# Patient Record
Sex: FEMALE | Race: OTHER
[De-identification: names, ages, dates, MRNs, and addresses within clinical notes are randomized per-mention and may not be internally consistent; named-entity substitution may affect disease eponyms.]

---

## 2019-06-26 ENCOUNTER — HOSPITAL ENCOUNTER (OUTPATIENT)
Dept: HOSPITAL 74 - JRADIR | Age: 44
Discharge: HOME | End: 2019-06-26
Payer: COMMERCIAL

## 2019-06-26 VITALS — SYSTOLIC BLOOD PRESSURE: 139 MMHG | DIASTOLIC BLOOD PRESSURE: 60 MMHG | TEMPERATURE: 97.3 F | HEART RATE: 52 BPM

## 2019-06-26 VITALS — BODY MASS INDEX: 25.7 KG/M2

## 2019-06-26 DIAGNOSIS — C50.912: Primary | ICD-10-CM

## 2019-06-26 PROCEDURE — B518ZZA FLUOROSCOPY OF SUPERIOR VENA CAVA, GUIDANCE: ICD-10-PCS

## 2019-06-26 PROCEDURE — 36561 INSERT TUNNELED CV CATH: CPT

## 2019-06-26 PROCEDURE — C1788 PORT, INDWELLING, IMP: HCPCS

## 2019-06-26 PROCEDURE — 02HV33Z INSERTION OF INFUSION DEVICE INTO SUPERIOR VENA CAVA, PERCUTANEOUS APPROACH: ICD-10-PCS

## 2019-06-26 PROCEDURE — 77001 FLUOROGUIDE FOR VEIN DEVICE: CPT

## 2019-10-31 ENCOUNTER — HOSPITAL ENCOUNTER (INPATIENT)
Dept: HOSPITAL 74 - FM/S | Age: 44
LOS: 1 days | Discharge: HOME | DRG: 362 | End: 2019-11-01
Attending: SURGERY | Admitting: SURGERY
Payer: COMMERCIAL

## 2019-10-31 VITALS — BODY MASS INDEX: 25.8 KG/M2

## 2019-10-31 DIAGNOSIS — C77.3: ICD-10-CM

## 2019-10-31 DIAGNOSIS — C50.812: Primary | ICD-10-CM

## 2019-10-31 DIAGNOSIS — C79.51: ICD-10-CM

## 2019-10-31 DIAGNOSIS — Z17.1: ICD-10-CM

## 2019-10-31 DIAGNOSIS — M95.4: ICD-10-CM

## 2019-10-31 PROCEDURE — 0HTV0ZZ RESECTION OF BILATERAL BREAST, OPEN APPROACH: ICD-10-PCS | Performed by: SURGERY

## 2019-10-31 PROCEDURE — 4A1GXSH MONITORING OF SKIN AND BREAST VASCULAR PERFUSION USING INDOCYANINE GREEN DYE, EXTERNAL APPROACH: ICD-10-PCS | Performed by: SURGERY

## 2019-10-31 PROCEDURE — A9541 TC99M SULFUR COLLOID: HCPCS

## 2019-10-31 PROCEDURE — 0HHV0NZ INSERTION OF TISSUE EXPANDER INTO BILATERAL BREAST, OPEN APPROACH: ICD-10-PCS | Performed by: SURGERY

## 2019-10-31 PROCEDURE — 07B60ZX EXCISION OF LEFT AXILLARY LYMPHATIC, OPEN APPROACH, DIAGNOSTIC: ICD-10-PCS | Performed by: SURGERY

## 2019-10-31 RX ADMIN — ONDANSETRON PRN MG: 2 INJECTION INTRAMUSCULAR; INTRAVENOUS at 16:02

## 2019-10-31 RX ADMIN — ZOLPIDEM TARTRATE PRN MG: 5 TABLET, COATED ORAL at 23:17

## 2019-10-31 RX ADMIN — CEFAZOLIN SODIUM SCH MLS/HR: 1 INJECTION, SOLUTION INTRAVENOUS at 23:18

## 2019-10-31 RX ADMIN — ONDANSETRON PRN MG: 2 INJECTION INTRAMUSCULAR; INTRAVENOUS at 23:32

## 2019-10-31 RX ADMIN — CEFAZOLIN SODIUM SCH MLS/HR: 1 INJECTION, SOLUTION INTRAVENOUS at 18:27

## 2019-11-01 VITALS — TEMPERATURE: 99.3 F | HEART RATE: 66 BPM | DIASTOLIC BLOOD PRESSURE: 57 MMHG | SYSTOLIC BLOOD PRESSURE: 106 MMHG

## 2019-11-01 LAB
DEPRECATED RDW RBC AUTO: 12.5 % (ref 11.6–15.6)
HCT VFR BLD CALC: 30.4 % (ref 32.4–45.2)
HGB BLD-MCNC: 9.8 GM/DL (ref 10.7–15.3)
MCH RBC QN AUTO: 29.5 PG (ref 25.7–33.7)
MCHC RBC AUTO-ENTMCNC: 32.2 G/DL (ref 32–36)
MCV RBC: 91.4 FL (ref 80–96)
PLATELET # BLD AUTO: 238 K/MM3 (ref 134–434)
PMV BLD: 8.1 FL (ref 7.5–11.1)
RBC # BLD AUTO: 3.32 M/MM3 (ref 3.6–5.2)
WBC # BLD AUTO: 8.6 K/MM3 (ref 4–10.8)

## 2019-11-01 RX ADMIN — CEFAZOLIN SODIUM SCH MLS/HR: 1 INJECTION, SOLUTION INTRAVENOUS at 12:16

## 2019-11-01 RX ADMIN — ZOLPIDEM TARTRATE PRN MG: 5 TABLET, COATED ORAL at 01:00

## 2019-11-01 RX ADMIN — CEFAZOLIN SODIUM SCH MLS/HR: 1 INJECTION, SOLUTION INTRAVENOUS at 06:20

## 2022-06-02 DIAGNOSIS — Z86.39 PERSONAL HISTORY OF OTHER ENDOCRINE, NUTRITIONAL AND METABOLIC DISEASE: ICD-10-CM

## 2022-06-02 DIAGNOSIS — Z85.3 PERSONAL HISTORY OF MALIGNANT NEOPLASM OF BREAST: ICD-10-CM

## 2022-06-02 DIAGNOSIS — Z85.72 PERSONAL HISTORY OF NON-HODGKIN LYMPHOMAS: ICD-10-CM

## 2022-06-02 DIAGNOSIS — Z78.9 OTHER SPECIFIED HEALTH STATUS: ICD-10-CM

## 2022-06-02 DIAGNOSIS — Z86.79 PERSONAL HISTORY OF OTHER DISEASES OF THE CIRCULATORY SYSTEM: ICD-10-CM

## 2022-06-02 PROBLEM — Z00.00 ENCOUNTER FOR PREVENTIVE HEALTH EXAMINATION: Status: ACTIVE | Noted: 2022-06-02

## 2022-06-02 RX ORDER — LEVOTHYROXINE SODIUM 0.03 MG/1
25 TABLET ORAL
Refills: 0 | Status: ACTIVE | COMMUNITY

## 2022-06-02 RX ORDER — OMEPRAZOLE 40 MG/1
40 CAPSULE, DELAYED RELEASE ORAL
Refills: 0 | Status: ACTIVE | COMMUNITY

## 2022-06-02 RX ORDER — TIZANIDINE 4 MG/1
TABLET ORAL
Refills: 0 | Status: ACTIVE | COMMUNITY

## 2022-06-07 ENCOUNTER — APPOINTMENT (OUTPATIENT)
Dept: BREAST CENTER | Facility: CLINIC | Age: 47
End: 2022-06-07
Payer: COMMERCIAL

## 2022-06-07 DIAGNOSIS — R92.8 OTHER ABNORMAL AND INCONCLUSIVE FINDINGS ON DIAGNOSTIC IMAGING OF BREAST: ICD-10-CM

## 2022-06-07 DIAGNOSIS — Z85.3 PERSONAL HISTORY OF MALIGNANT NEOPLASM OF BREAST: ICD-10-CM

## 2022-06-07 PROCEDURE — 99214 OFFICE O/P EST MOD 30 MIN: CPT

## 2022-06-07 NOTE — ASSESSMENT
[FreeTextEntry1] : The patient is a 47-year-old G3, P3  female of [place] descent.  She underwent menarche at age 10 and had her first child at age 17.  She has no family history of cancer.  She developed significant left hip pain at the end of March 2019 and initially underwent a left hip x-ray showing a lucency within the left acetabulum.  An MRI was then performed at [facility] on April 10, 2019 showing a suspicious lesion in the left acetabulum as well as other lesions in the pelvis and lower lumbar spine suspicious for metastatic cancer.  She then underwent a PET scan on April 11, 2019 which showed a suspicious left breast density as well as extensive left axillary adenopathy and there was intensely avid lesions as well in the left iliac bone and enhancement in the left supraclavicular and left axillary regions.  Mammography and ultrasound performed on April 15, 2019 showed suspicious calcifications in the left breast 12:00 region with a mass and ultrasound showed a hypoechoic 9 mm mass in the left breast 12:00 region as well as multiple enlarged axillary lymph nodes with the largest measuring 2.7 cm.  This was all felt to be due to metastatic breast cancer to the bone.  Chest x-ray showed an opacity in the right lower lobe of the lung as well.  The patient was seen by Dr. Blair and was told that this was likely metastatic breast cancer.  She was seen by Dr. Fidencio Gonzalez at [facility] who performed a bone biopsy which showed lymphoma.  She underwent a left breast 12:00 retroareolar core biopsy which showed a poorly differentiated invasive duct cancer which was triple negative with a Ki-67 of 80%.  I then went ahead and she had a biopsy of a left axillary tail lymph node on May 31, 2019 which showed again a poorly differentiated metastatic cancer with necrosis.  MRI performed on June 17, 2019 showed 2 clips in the left breast retroareolar and axillary region as well as another area of suspicion in the left breast 1:00 region with suspicious enhancement.  She was presented at tumor board and decision was made to treat her as a locally advanced stage III breast cancer and she underwent AC-T chemotherapy which finished on August 16, 2019 through Dr. Ly and Dr. Blair.  She did undergo IZEA genetic panel testing which was negative but did have a VUS in the GALNT12 and STK 11 genes.  She underwent a follow-up MRI and PET scan after neoadjuvant chemotherapy and MRI continue to show some enhancement in the left breast with significant decrease in size and there was still some continued left axillary adenopathy but diminished in size.  The cancer now measured about 9 mm.  PET scan no longer showed any enhancement in the supraclavicular region nor in the left iliac region where she previously had hypermetabolic activity.  I performed bilateral total mastectomies with a left axillary sentinel lymph node biopsy followed by an axillary lymph node dissection and subpectoral expander reconstructions by Dr. Murry on October 31, 2019.  Final pathology showed 5 out of 9 positive lymph nodes with extranodal extension and the right mastectomy was benign.  The left mastectomy continue to show a 1.5 cm poorly differentiated invasive duct cancer but margins were negative.  She did have lymphovascular invasion.  The cancer remained triple negative with a Ki-67 of 60%.  This was a pathologic prognostic stage IIIC breast cancer after neoadjuvant chemotherapy.  She was seen by Dr. Blair postoperatively and Xeloda was recommended and she also underwent postmastectomy radiation over her left expander.  She was lost to follow-up with me for a couple years and did later have her expanders removed and underwent bilateral VINCE flap reconstructions by Dr. Murry and Roxana.  She comes in now after her PET scan performed on April 21, 2022 showed no further significant enhancement in the bony regions but she did have some new mild enhancement at the margin of the left VINCE which could be evolving fat necrosis and some mild fluid collection along the left chest wall which may be a chronic seroma.  There was also some new mild uptake in a right axillary lymph node.  On exam today, she has a good result after her bilateral VINCE flap reconstructions with no evidence of recurrence.  She does have some fat necrosis more in the left breast on the right likely related to prior radiation.  The patient was reassured that she has no suspicious findings.  I would like her to get a directed ultrasound of these regions on the margin of the left implant and to rule out any significant fluid collection around the left implant  as well as to evaluate the right axilla.  I will also get a bilateral mammography of her VINCE flaps.  She was initially planned to have some more fat grafting by Dr. Schmidt later this week but I had her cancel this so we can work-up these PET findings.  If this all seems to be just inflammatory, I could then clear her from an oncologic standpoint to go forward with the fat grafting.  She should continue routine follow-up with Dr. Blair.  She was advised to continue 6-month follow-up with me at least until she is 5 years postop in 2024.

## 2022-06-07 NOTE — REASON FOR VISIT
[Follow-Up: _____] : a [unfilled] follow-up visit [FreeTextEntry1] : The patient comes in and is of [place] descent and presented with left hip pain which imaging showed to be likely metastatic disease in her left acetabulum and iliac bones.  A PET scan showed a suspicious left breast mass with left axillary and supraclavicular adenopathy consistent with metastatic breast cancer.  She was seen by Dr. Gonzalez at [facility] and the left hip bone biopsy showed a lymphoma.  The left breast retroareolar core biopsy however showed a poorly differentiated triple negative breast cancer with a Ki-67 of 80% and she was also found to have axillary lymph node biopsy-proven disease.  She was given neoadjuvant AC-T chemotherapy through Dr. Ly and Dr. Blair which finished in August 2019.  Follow-up MRI showed significant response to the treatment and she underwent bilateral total mastectomies with a left axilla lymph node dissection and subpectoral expander reconstructions by Dr. Murry in October 2019.  Final pathology showed residual 1.5 cm region of poorly differentiated triple negative breast cancer with 5 out of 9 positive nodes.  This was a pathologic prognostic stage IIIC breast cancer after neoadjuvant chemotherapy.  She underwent postmastectomy radiation over the left expander and then had her expanders removed and underwent bilateral VINCE flap reconstructions.  She continues follow-up with Dr. Blair and comes in for interval follow-up after being lost to follow-up for couple years.

## 2022-06-07 NOTE — HISTORY OF PRESENT ILLNESS
[FreeTextEntry1] : The patient is a 47-year-old G3, P3  female of [place] descent.  She underwent menarche at age 10 and had her first child at age 17.  She has no family history of cancer.  She developed significant left hip pain at the end of March 2019 and initially underwent a left hip x-ray showing a lucency within the left acetabulum.  An MRI was then performed at [facility] on April 10, 2019 showing a suspicious lesion in the left acetabulum as well as other lesions in the pelvis and lower lumbar spine suspicious for metastatic cancer.  She then underwent a PET scan on April 11, 2019 which showed a suspicious left breast density as well as extensive left axillary adenopathy and there was intensely avid lesions as well in the left iliac bone and enhancement in the left supraclavicular and left axillary regions.  Mammography and ultrasound performed on April 15, 2019 showed suspicious calcifications in the left breast 12:00 region with a mass and ultrasound showed a hypoechoic 9 mm mass in the left breast 12:00 region as well as multiple enlarged axillary lymph nodes with the largest measuring 2.7 cm.  This was all felt to be due to metastatic breast cancer to the bone.  Chest x-ray showed an opacity in the right lower lobe of the lung as well.  The patient was seen by Dr. Blair and was told that this was likely metastatic breast cancer.  She was seen by Dr. Fidencio Gonzalez at [facility] who performed a bone biopsy which showed lymphoma.  She underwent a left breast 12:00 retroareolar core biopsy which showed a poorly differentiated invasive duct cancer which was triple negative with a Ki-67 of 80%.  I then went ahead and she had a biopsy of a left axillary tail lymph node on May 31, 2019 which showed again a poorly differentiated metastatic cancer with necrosis.  MRI performed on June 17, 2019 showed 2 clips in the left breast retroareolar and axillary region as well as another area of suspicion in the left breast 1:00 region with suspicious enhancement.  She was presented at tumor board and decision was made to treat her as a locally advanced stage III breast cancer and she underwent AC-T chemotherapy which finished on August 16, 2019 through Dr. Ly and Dr. Blair.  She did undergo Nokter genetic panel testing which was negative but did have a VUS in the GALNT12 and STK 11 genes.  She underwent a follow-up MRI and PET scan after neoadjuvant chemotherapy and MRI continue to show some enhancement in the left breast with significant decrease in size and there was still some continued left axillary adenopathy but diminished in size.  The cancer now measured about 9 mm.  PET scan no longer showed any enhancement in the supraclavicular region nor in the left iliac region where she previously had hypermetabolic activity.  I performed bilateral total mastectomies with a left axillary sentinel lymph node biopsy followed by an axillary lymph node dissection and subpectoral expander reconstructions by Dr. Murry on October 31, 2019.  Final pathology showed 5 out of 9 positive lymph nodes with extranodal extension in the right mastectomy was benign.  The left mastectomy continue to show a 1.5 cm poorly differentiated invasive duct cancer but margins were negative.  She did have lymphovascular invasion.  The cancer remained triple negative with a Ki-67 of 60%.  This was a pathologic prognostic stage IIIC breast cancer after neoadjuvant chemotherapy.  She was seen by Dr. Blair postoperatively and Xeloda was recommended and she also underwent postmastectomy radiation over her left expander.  She then later had her expanders removed and underwent bilateral VINCE flap reconstructions by Dr. Ming Murry.  She was lost to follow-up in 2019 and comes in now to reestablish care.

## 2022-06-07 NOTE — PHYSICAL EXAM
[Normocephalic] : normocephalic [Atraumatic] : atraumatic [EOMI] : extra ocular movement intact [Supple] : supple [No Supraclavicular Adenopathy] : no supraclavicular adenopathy [Examined in the supine and seated position] : examined in the supine and seated position [No dominant masses] : no dominant masses in right breast  [No dominant masses] : no dominant masses left breast [Breast Mass Right Breast ___cm] : no masses [Breast Mass Left Breast ___cm] : no masses [No Axillary Lymphadenopathy] : no left axillary lymphadenopathy [No Edema] : no edema [No Rashes] : no rashes [No Ulceration] : no ulceration [de-identified] : Status post bilateral total mastectomies and left axillary lymph node dissection with initial expander reconstruction and then postmastectomy radiation of the left side and then bilateral exchange of her expanders for VINCE flap reconstructions..  On palpation, she has a good result after her bilateral VINCE flap reconstructions and does have a fair amount of fat necrosis on the left side but an acceptable result and no evidence of recurrence.  She had bilateral nipple reconstructions.  She has no axillary, supraclavicular, or cervical adenopathy. [de-identified] : Status post total mastectomy and expander reconstruction with later exchange for VINCE flap with no suspicious findings. [de-identified] : Status post total mastectomy and expander reconstruction with later exchange for VINCE flap with no evidence of recurrence but some fat necrosis present.

## 2022-06-22 ENCOUNTER — RESULT REVIEW (OUTPATIENT)
Age: 47
End: 2022-06-22

## 2022-06-27 ENCOUNTER — RESULT REVIEW (OUTPATIENT)
Age: 47
End: 2022-06-27

## 2022-06-27 ENCOUNTER — NON-APPOINTMENT (OUTPATIENT)
Age: 47
End: 2022-06-27

## 2023-04-28 ENCOUNTER — APPOINTMENT (OUTPATIENT)
Dept: ENDOCRINOLOGY | Facility: CLINIC | Age: 48
End: 2023-04-28